# Patient Record
(demographics unavailable — no encounter records)

---

## 2025-02-24 NOTE — DISCUSSION/SUMMARY
[FreeTextEntry1] : 78 yo lady with PMHx HTN, HLD, and DM2 who presents as a new patient  Assessment: 1. Cardiac chest pain Pretest probability intermediate given age + risk factors 2. HTN 3. HLD  4. DM2 5. Syncope   Plan: 1. CT coronary with IV contrast 2. TTE 3. Jardiance for DM2 also has CV benefit - explained to the pt for compliance 4. Will dc ASA if CCTA negative 5. Given DM2, mod statin needed, start atorvastatin 10mg PO QHS  6. RTC 1mo   During non face-to-face time, I reviewed relevant portions of the patients medical record. During face-to-face time, I took a relevant history and examined the patient. I also explained differential diagnoses, relevant cardiac diagnoses, workup, and management plan, which required a moderate level of medical decision making. I answered all questions related to the patient's medical conditions.   Mateo BISHOP (John)  of Cardiology [facility] [employer] [facility] at [facility]        [EKG obtained to assist in diagnosis and management of assessed problem(s)] : EKG obtained to assist in diagnosis and management of assessed problem(s)

## 2025-02-24 NOTE — HISTORY OF PRESENT ILLNESS
[FreeTextEntry1] : 78 yo lady with PMHx HTN, HLD, and DM2 who presents as a new patient  02/24/25 Exertional chest pain c/w typical angina for last 3 weeks (especially while walking up the stairs when elevator broke in her apartment). Also had syncope x 1 1 month ago. Last Feb, had temporary vision change, TIA/CVA was ruled out.

## 2025-02-24 NOTE — DISCUSSION/SUMMARY
[FreeTextEntry1] : 78 yo lady with PMHx HTN, HLD, and DM2 who presents as a new patient  Assessment: 1. Cardiac chest pain Pretest probability intermediate given age + risk factors 2. HTN 3. HLD  4. DM2 5. Syncope   Plan: 1. CT coronary with IV contrast 2. TTE 3. Jardiance for DM2 also has CV benefit - explained to the pt for compliance 4. Will dc ASA if CCTA negative 5. Given DM2, mod statin needed, start atorvastatin 10mg PO QHS  6. RTC 1mo   During non face-to-face time, I reviewed relevant portions of the patients medical record. During face-to-face time, I took a relevant history and examined the patient. I also explained differential diagnoses, relevant cardiac diagnoses, workup, and management plan, which required a moderate level of medical decision making. I answered all questions related to the patient's medical conditions.   Mateo BISHOP (John)  of Cardiology [facility] at [facility]        [EKG obtained to assist in diagnosis and management of assessed problem(s)] : EKG obtained to assist in diagnosis and management of assessed problem(s)

## 2025-03-24 NOTE — DISCUSSION/SUMMARY
[FreeTextEntry1] : 76 yo lady with PMHx HTN, HLD, and DM2   Assessment: 1. Cardiac chest pain Pretest probability intermediate given age + risk factors 2. HTN 3. HLD  4. DM2 5. Syncope and dizziness   Plan: 1. CT coronary with IV contrast --> pending  2. TTE done, will review, prelim read without major cardiac disease contributing to her sx -> asked to see neurologist. Will obtain carotid duplex US next visit 3. Jardiance for DM2 also has CV benefit - explained to the pt for compliance 4. Will dc ASA if CCTA negative, still pending 5. Given DM2, mod statin needed, started atorvastatin 10mg PO QHS last visit 6. RTC 2mo   During non face-to-face time, I reviewed relevant portions of the patients medical record. During face-to-face time, I took a relevant history and examined the patient. I also explained differential diagnoses, relevant cardiac diagnoses, workup, and management plan, which required a moderate level of medical decision making. I answered all questions related to the patient's medical conditions.   Mateo BISHOP (John)  of Cardiology [facility] at [facility]

## 2025-03-24 NOTE — DISCUSSION/SUMMARY
[FreeTextEntry1] : 78 yo lady with PMHx HTN, HLD, and DM2   Assessment: 1. Cardiac chest pain Pretest probability intermediate given age + risk factors 2. HTN 3. HLD  4. DM2 5. Syncope and dizziness   Plan: 1. CT coronary with IV contrast --> pending  2. TTE done, will review, prelim read without major cardiac disease contributing to her sx -> asked to see neurologist. Will obtain carotid duplex US next visit 3. Jardiance for DM2 also has CV benefit - explained to the pt for compliance 4. Will dc ASA if CCTA negative, still pending 5. Given DM2, mod statin needed, started atorvastatin 10mg PO QHS last visit 6. RTC 2mo   During non face-to-face time, I reviewed relevant portions of the patients medical record. During face-to-face time, I took a relevant history and examined the patient. I also explained differential diagnoses, relevant cardiac diagnoses, workup, and management plan, which required a moderate level of medical decision making. I answered all questions related to the patient's medical conditions.   Mateo BISHOP (John)  of Cardiology [facility] at [facility]

## 2025-03-24 NOTE — REVIEW OF SYSTEMS
[Chest Discomfort] : chest discomfort [Negative] : Heme/Lymph [Syncope] : syncope [Dizziness] : dizziness

## 2025-03-24 NOTE — HISTORY OF PRESENT ILLNESS
[FreeTextEntry1] : 76 yo lady with PMHx HTN, HLD, and DM2   03/24/25 ROS + new dizziness and angina  03/24/25 Continued typical angina. No new syncope. No other new sx since the last visit 02/24/25 Exertional chest pain c/w typical angina for last 3 weeks (especially while walking up the stairs when elevator broke in her apartment). Also had syncope x 1 1 month ago. Last Feb, had temporary vision change, TIA/CVA was ruled out.

## 2025-04-30 NOTE — REVIEW OF SYSTEMS
[Chest Discomfort] : chest discomfort [Syncope] : syncope [Dizziness] : dizziness [Negative] : Heme/Lymph

## 2025-05-01 NOTE — DISCUSSION/SUMMARY
[FreeTextEntry1] : 78 yo lady with PMHx HTN, HLD, and DM2   Assessment: 1. Cardiac chest pain Pretest probability intermediate given age + risk factors -> CT coronary with IV contrast April/2025 Ca score 0, 3mm pulm nodule stable from March/2020 Likely musculoskeletal vs coronary vasospasm 2. HTN 3. HLD  4. DM2 5. Syncope and dizziness TTE Mar/2025 mild valve disease   Plan: 1. Given syncope, carotid duplex US done today, prelim read normal 2. Jardiance for DM2 also has CV benefit - explained to the pt for compliance 3. Okay to stop ASA 4. Given DM2, mod statin needed, started atorvastatin 10mg PO QHS last visit 5. RTC 6mo   During non face-to-face time, I reviewed relevant portions of the patients medical record. During face-to-face time, I took a relevant history and examined the patient. I also explained differential diagnoses, relevant cardiac diagnoses, workup, and management plan, which required a moderate level of medical decision making. I answered all questions related to the patient's medical conditions.   Mateo BISHOP (John)  of Cardiology [facility] [employer] [facility] at [facility]

## 2025-05-01 NOTE — HISTORY OF PRESENT ILLNESS
[FreeTextEntry1] : 76 yo lady with PMHx HTN, HLD, and DM2   05/01/25 ROS continued dizziness 03/24/25 ROS + new dizziness and angina  03/24/25 Continued typical angina. No new syncope. No other new sx since the last visit 02/24/25 Exertional chest pain c/w typical angina for last 3 weeks (especially while walking up the stairs when elevator broke in her apartment). Also had syncope x 1 1 month ago. Last Feb, had temporary vision change, TIA/CVA was ruled out.

## 2025-05-01 NOTE — DISCUSSION/SUMMARY
[FreeTextEntry1] : 78 yo lady with PMHx HTN, HLD, and DM2   Assessment: 1. Cardiac chest pain Pretest probability intermediate given age + risk factors -> CT coronary with IV contrast April/2025 Ca score 0, 3mm pulm nodule stable from March/2020 Likely musculoskeletal vs coronary vasospasm 2. HTN 3. HLD  4. DM2 5. Syncope and dizziness TTE Mar/2025 mild valve disease   Plan: 1. Given syncope, carotid duplex US done today, prelim read normal 2. Jardiance for DM2 also has CV benefit - explained to the pt for compliance 3. Okay to stop ASA 4. Given DM2, mod statin needed, started atorvastatin 10mg PO QHS last visit 5. RTC 6mo   During non face-to-face time, I reviewed relevant portions of the patients medical record. During face-to-face time, I took a relevant history and examined the patient. I also explained differential diagnoses, relevant cardiac diagnoses, workup, and management plan, which required a moderate level of medical decision making. I answered all questions related to the patient's medical conditions.   Mateo BISHOP (John)  of Cardiology [facility] at [facility]